# Patient Record
Sex: MALE | URBAN - METROPOLITAN AREA
[De-identification: names, ages, dates, MRNs, and addresses within clinical notes are randomized per-mention and may not be internally consistent; named-entity substitution may affect disease eponyms.]

---

## 2022-03-22 ENCOUNTER — HOSPITAL ENCOUNTER (OUTPATIENT)
Age: 47
Setting detail: SPECIMEN
Discharge: HOME | End: 2022-03-22

## 2022-03-22 LAB
ABSOLUTE EOS #: 0.17 K/UL (ref 0–0.44)
ABSOLUTE IMMATURE GRANULOCYTE: 0.04 K/UL (ref 0–0.3)
ABSOLUTE LYMPH #: 2.72 K/UL (ref 1.1–3.7)
ABSOLUTE MONO #: 0.78 K/UL (ref 0.1–1.2)
ALBUMIN SERPL-MCNC: 4.2 G/DL (ref 3.5–5.2)
ALBUMIN/GLOBULIN RATIO: 1.3 (ref 1–2.5)
ALP BLD-CCNC: 102 U/L (ref 40–129)
ALT SERPL-CCNC: 26 U/L (ref 5–41)
ANION GAP SERPL CALCULATED.3IONS-SCNC: 16 MMOL/L (ref 9–17)
AST SERPL-CCNC: 23 U/L
BASOPHILS # BLD: 1 % (ref 0–2)
BASOPHILS ABSOLUTE: 0.07 K/UL (ref 0–0.2)
BILIRUB SERPL-MCNC: 0.22 MG/DL (ref 0.3–1.2)
BUN BLDV-MCNC: 19 MG/DL (ref 6–20)
CALCIUM SERPL-MCNC: 9.2 MG/DL (ref 8.6–10.4)
CHLORIDE BLD-SCNC: 101 MMOL/L (ref 98–107)
CHOLESTEROL, FASTING: 161 MG/DL
CHOLESTEROL/HDL RATIO: 4.1
CO2: 22 MMOL/L (ref 20–31)
CREAT SERPL-MCNC: 1.01 MG/DL (ref 0.7–1.2)
EOSINOPHILS RELATIVE PERCENT: 2 % (ref 1–4)
FOLATE: 9.6 NG/ML
GFR AFRICAN AMERICAN: >60 ML/MIN
GFR NON-AFRICAN AMERICAN: >60 ML/MIN
GFR SERPL CREATININE-BSD FRML MDRD: ABNORMAL ML/MIN/{1.73_M2}
GLUCOSE BLD-MCNC: 76 MG/DL (ref 70–99)
HCT VFR BLD CALC: 49.1 % (ref 40.7–50.3)
HDLC SERPL-MCNC: 39 MG/DL
HEMOGLOBIN: 15.5 G/DL (ref 13–17)
IMMATURE GRANULOCYTES: 0 %
LDL CHOLESTEROL: 86 MG/DL (ref 0–130)
LYMPHOCYTES # BLD: 27 % (ref 24–43)
MCH RBC QN AUTO: 29 PG (ref 25.2–33.5)
MCHC RBC AUTO-ENTMCNC: 31.6 G/DL (ref 28.4–34.8)
MCV RBC AUTO: 91.8 FL (ref 82.6–102.9)
MONOCYTES # BLD: 8 % (ref 3–12)
NRBC AUTOMATED: 0 PER 100 WBC
PDW BLD-RTO: 13.3 % (ref 11.8–14.4)
PLATELET # BLD: 353 K/UL (ref 138–453)
PMV BLD AUTO: 10.1 FL (ref 8.1–13.5)
POTASSIUM SERPL-SCNC: 4 MMOL/L (ref 3.7–5.3)
RBC # BLD: 5.35 M/UL (ref 4.21–5.77)
SEG NEUTROPHILS: 62 % (ref 36–65)
SEGMENTED NEUTROPHILS ABSOLUTE COUNT: 6.47 K/UL (ref 1.5–8.1)
SODIUM BLD-SCNC: 139 MMOL/L (ref 135–144)
TOTAL PROTEIN: 7.4 G/DL (ref 6.4–8.3)
TRIGLYCERIDE, FASTING: 180 MG/DL
TSH SERPL DL<=0.05 MIU/L-ACNC: 3.94 MIU/L (ref 0.3–5)
VITAMIN B-12: 601 PG/ML (ref 232–1245)
VITAMIN D 25-HYDROXY: 9.2 NG/ML
WBC # BLD: 10.3 K/UL (ref 3.5–11.3)

## 2023-10-16 ENCOUNTER — HOSPITAL ENCOUNTER (OUTPATIENT)
Dept: PHYSICAL THERAPY | Age: 48
Setting detail: THERAPIES SERIES
Discharge: HOME | End: 2023-10-16
Payer: COMMERCIAL

## 2023-10-16 PROCEDURE — 97161 PT EVAL LOW COMPLEX 20 MIN: CPT

## 2023-10-16 PROCEDURE — G0283 ELEC STIM OTHER THAN WOUND: HCPCS

## 2023-10-16 PROCEDURE — 97140 MANUAL THERAPY 1/> REGIONS: CPT

## 2023-10-16 NOTE — PLAN OF CARE
[facility]           Phone: [phone]             Outpatient Physical Therapy  Fax: [phone]                                           Date: 10/16/2023  Patient: Jenna Brown : 1975 [facility] #: [id number]   Referring Physician: Caryn Singh MD      [x] Plan of Care   [] Updated Plan of Care    Dates of Service to Include: 10/16/2023 to 23    Diagnosis:  M25.551 Pain in L hip, m25.551 pain in R hip    Rehab (Treatment) Diagnosis:  L hip pain             Onset Date:  23    Attendance  Total # of Visits to Date: 1        Assessment  Assessment: Pt is a 50year old male with chronic L hip pain with progressively worsening symptoms when sleeping on his L side. Pt presents with point tenderness to L trochanter, decrease L hip abductors and L posterior rotated illium. Pt will benefit from skilled PT in order to address these deficits.       Goals  Short Term Goals  Time Frame for Short Term Goals: 2 weeks  Short Term Goal 1: Pt will be educated on his POC and HEP-met  Short Term Goal 2: Pt will maintain proper pelvic alignment in order to decrease L hip pain  Long Term Goals  Time Frame for Long Term Goals : 4 weeks  Long Term Goal 1: Pt will be safe and independent with his HEP  Long Term Goal 2: Pt will increase L hip abduction strength to 5/5 in order to increase ambulation tolerance  Long Term Goal 3: Pt will deny tenderness to L hip trochanter in order to increase sleeping tolerance on L side  Long Term Goal 4: Pt will report 70% improvement in symptoms     Prognosis  Therapy Prognosis: Good    Treatment Plan   Plan Frequency: 2x/wk  Plan weeks: 4 weeks  [x] HP/CP      [x] Electrical Stim   [x] Therapeutic Exercise      [] Gait Training  [x] Aquatics   [x] Ultrasound         [x] Patient Education/HEP   [x] Manual Therapy  [] Traction    [] Neuro-petty        [x] Soft Tissue Mobs            []

## 2023-10-16 NOTE — PROGRESS NOTES
Assessment  Assessment: Pt is a 50year old male with chronic L hip pain with progressively worsening symptoms when sleeping on his L side. Pt presents with point tenderness to L trochanter, decrease L hip abductors and L posterior rotated illium. Pt will benefit from skilled PT in order to address these deficits. Therapy Prognosis: Good        Decision Making: Low Complexity    Patient Education  Patient Education: pt educated on his POC andHPE  Pt verbalized/demonstrated good understanding:     [X] Yes         [] No, pt required further clarification.       Goals  Short Term Goals  Time Frame for Short Term Goals: 2 weeks  Short Term Goal 1: Pt will be educated on his POC and HEP-met  Short Term Goal 2: Pt will maintain proper pelvic alignment in order to decrease L hip pain    Long Term Goals  Time Frame for Long Term Goals : 4 weeks  Long Term Goal 1: Pt will be safe and independent with his HEP  Long Term Goal 2: Pt will increase L hip abduction strength to 5/5 in order to increase ambulation tolerance  Long Term Goal 3: Pt will deny tenderness to L hip trochanter in order to increase sleeping tolerance on L side  Long Term Goal 4: Pt will report 70% improvement in symptoms      Patient Goals : \"to sleep without pain\"        Minutes Tracking:  Time In: 1156  Time Out: 1240  Minutes: 44  Timed Code Treatment Minutes: [address] PT, DPT    10/16/2023  Electronically signed by Terry Campos PT on 10/16/2023 at 12:44 PM

## 2023-10-18 ENCOUNTER — HOSPITAL ENCOUNTER (OUTPATIENT)
Dept: PHYSICAL THERAPY | Age: 48
Setting detail: THERAPIES SERIES
Discharge: HOME | End: 2023-10-18
Payer: COMMERCIAL

## 2023-10-18 PROCEDURE — 97110 THERAPEUTIC EXERCISES: CPT

## 2023-10-18 NOTE — PROGRESS NOTES
Phone: [address] [place] [address]           Fax: [phone]                           Outpatient Physical Therapy                                                                            Daily Note    Patient: Roshan Pulido : 1975  CSN #: [id number]   Referring Physician: Korin Tsang MD  Date: 10/18/2023    Diagnosis: M25.551 Pain in L hip, m25.551 pain in R hip  Treatment Diagnosis: L hip pain    Onset Date: 23  PT Insurance Information: [place] advantage  Total # of Visits Approved: 12 Per Physician Order  Total # of Visits to Date: 2  No Show: 0  Canceled Appointment: 0    23 Plan of Care/Recert Due    Pre-Treatment Pain:  4/10  Subjective: Pt reports 4/10 pain today L > R. Pt states he slept a little better last night. Exercises:  Exercise 1: HEP LTR, clam shells  Exercise 2: Scifit 5 min  Exercise 3: Sink ex x10, ASU 6\" x10 ea  Exercise 4: LTR, Sidelying clamshells and hip abd, SLR x10 ea, hooklying ball squeeze and GTB abd x15    Manual:  Muscle Energy: MET for L posterior pelvic rotation    Modality:     Modality Flow Sheet:   Performed (X) Tx Modality    Electrical Stim:      Ultrasound: ___ W/cm2 x ___ mins  Duty factor: __100%  __50%  __20% __10%  Head size:   MHz: __1mHz __2 mHz  __3mHz  Location:    Hot Pack:   x Cold Pack: 10 min L hip    Cervical Traction:  Pull:  Weight ____  Rest ____    Hold Time: _____ sec   Rest: ____  Total Tx Time: ____min    Lumbar Traction:  Pull:  Weight ____  Rest ____    Hold Time: _____ sec   Rest: ____  Total Tx Time: ____min  Bed Split:  Yes _____   or   No  _____    Iontophoresis:  Dexamethasone ______  Other: ______  Doseage: _____mA        Dry Needling: ___\" needle to at homeostatic neuro- trigger points to. ..   ___No manipulation/static  ___Basic Manipulation  ___Pistoning Manipulation  ___Needle Rotation  ___Tenting           Assessment  Assessment: Pt with good tolerance to tx, no increase in pain.  MET to L

## 2023-10-24 ENCOUNTER — HOSPITAL ENCOUNTER (OUTPATIENT)
Dept: PHYSICAL THERAPY | Age: 48
Setting detail: THERAPIES SERIES
Discharge: HOME | End: 2023-10-24
Payer: COMMERCIAL

## 2023-10-24 PROCEDURE — 97110 THERAPEUTIC EXERCISES: CPT

## 2023-10-24 PROCEDURE — G0283 ELEC STIM OTHER THAN WOUND: HCPCS

## 2023-10-24 NOTE — PROGRESS NOTES
Tolerance  Activity Tolerance: Patient tolerated treatment well    Patient Education  Patient Education: progression of exercises  Pt verbalized/demonstrated good understanding:     [x] Yes         [] No, pt required further clarification.        Post Treatment Pain:  2/10      Plan  Plan Frequency: 2x/wk  Plan weeks: 4 weeks       Goals  (Total # of Visits to Date: 3)      Short Term Goals  Time Frame for Short Term Goals: 2 weeks  Short Term Goal 1: Pt will be educated on his POC and HEP-met  Short Term Goal 2: Pt will maintain proper pelvic alignment in order to decrease L hip pain    Long Term Goals  Time Frame for Long Term Goals : 4 weeks  Long Term Goal 1: Pt will be safe and independent with his HEP  Long Term Goal 2: Pt will increase L hip abduction strength to 5/5 in order to increase ambulation tolerance  Long Term Goal 3: Pt will deny tenderness to L hip trochanter in order to increase sleeping tolerance on L side  Long Term Goal 4: Pt will report 70% improvement in symptoms    Minutes Tracking:  Time In: 1114  Time Out: 1203  Minutes: 49  Timed Code Treatment Minutes: [address] PT DPT      Date: 10/24/2023

## 2023-10-26 ENCOUNTER — HOSPITAL ENCOUNTER (OUTPATIENT)
Dept: PHYSICAL THERAPY | Age: 48
Setting detail: THERAPIES SERIES
Discharge: HOME | End: 2023-10-26
Payer: COMMERCIAL

## 2023-10-26 PROCEDURE — 97110 THERAPEUTIC EXERCISES: CPT

## 2023-10-26 NOTE — PROGRESS NOTES
Phone: [facility]           Fax: [phone]                           Outpatient Physical Therapy                                                                            Daily Note    Patient: Sabine Found : 1975  CSN #: [id number]   Referring Physician: Sahara Perkins MD  Date: 10/26/2023    Diagnosis: M25.551 Pain in L hip, m25.551 pain in R hip  Treatment Diagnosis: L hip pain    Onset Date: 23  PT Insurance Information: [place] advantage  Total # of Visits Approved: 12 Per Physician Order  Total # of Visits to Date: 4  No Show: 0  Canceled Appointment: 0    23 Plan of Care/Recert Due    Pre-Treatment Pain:  3-4/10  Subjective: Pt states he is doing a little better pain 3-4/10. Exercises:  Exercise 1: HEP LTR, clam shells  Exercise 2: Scifit 8 min  Exercise 3: Sink ex x10, ASU/LSU 6\" x10 ea  Exercise 4: LTR, Sidelying clamshells and hip abd, SLR x10 ea, hooklying ball squeeze and GTB abd x15  Exercise 5: Standing BTB 4-way hip x10 ea      Assessment  Assessment: Progressed ther ex for hip strengthening, good tolerance. No MET required this visit. Pt reports 20% improvement in symptoms. Pt declined MHP and IFC today. Continue to progress as pt tolerates. Activity Tolerance  Activity Tolerance: Patient tolerated treatment well    Patient Education  Patient Education: progression of exercises  Pt verbalized/demonstrated good understanding:     [x] Yes         [] No, pt required further clarification.        Post Treatment Pain:  3/10      Plan  Plan Frequency: 2x/wk  Plan weeks: 4 weeks       Goals  (Total # of Visits to Date: 4)      Short Term Goals  Time Frame for Short Term Goals: 2 weeks  Short Term Goal 1: Pt will be educated on his POC and HEP-met  Short Term Goal 2: Pt will maintain proper pelvic alignment in order to decrease L hip pain-progressing    Long Term Goals  Time Frame for Long Term Goals : 4 weeks  Long Term Goal 1: Pt

## 2023-10-30 ENCOUNTER — HOSPITAL ENCOUNTER (OUTPATIENT)
Dept: PHYSICAL THERAPY | Age: 48
Setting detail: THERAPIES SERIES
Discharge: HOME | End: 2023-10-30
Payer: COMMERCIAL

## 2023-10-30 PROCEDURE — 97110 THERAPEUTIC EXERCISES: CPT

## 2023-10-30 NOTE — PROGRESS NOTES
decrease L hip pain-met    Long Term Goals  Time Frame for Long Term Goals : 4 weeks  Long Term Goal 1: Pt will be safe and independent with his HEP-progressing  Long Term Goal 2: Pt will increase L hip abduction strength to 5/5 in order to increase ambulation tolerance-progressing(4/5 10/30/23)  Long Term Goal 3: Pt will deny tenderness to L hip trochanter in order to increase sleeping tolerance on L side-progressing  Long Term Goal 4: Pt will report 70% improvement in symptoms-progressing (20 % improvement in symptoms 10/26/23)    Minutes Tracking:  Time In: 0800  Time Out: [place]  Minutes: [address], [place]     Date: 10/30/2023

## 2023-11-01 ENCOUNTER — HOSPITAL ENCOUNTER (OUTPATIENT)
Dept: PHYSICAL THERAPY | Age: 48
Setting detail: THERAPIES SERIES
Discharge: HOME | End: 2023-11-01
Payer: COMMERCIAL

## 2023-11-01 PROCEDURE — 97110 THERAPEUTIC EXERCISES: CPT

## 2023-11-01 NOTE — PROGRESS NOTES
Phone: [facility] [place]           Fax: [phone]                           Outpatient Physical Therapy                                                                            Daily Note    Patient: Radha Borja : 1975  CSN #: [id number]   Referring Physician: Jannet Mckeon MD  Date: 2023    Diagnosis: M25.551 Pain in L hip, m25.551 pain in R hip  Treatment Diagnosis: L hip pain    Onset Date: 23  PT Insurance Information: [place] advantage  Total # of Visits Approved: 12 Per Physician Order  Total # of Visits to Date: 6  No Show: 0  Canceled Appointment: 0    23 Plan of Care/Recert Due    Pre-Treatment Pain:  4/10  Subjective: Pt reports 4/10 L hip pain today. Exercises:  Exercise 1: HEP LTR, clam shells  Exercise 2: Scifit 8 min L2.5  Exercise 3: Sink ex x15, ASU/LSU 6\" x15 ea  Exercise 5: Standing BTB 4-way hip x15 ea  Exercise 6: Sidestepping at counter with GTB x3 laps  Exercise 7: TG DL, SL x15 ea    Assessment  Assessment: Progressed strengtheing with TG this visit, good tolerance. Pt reported \"popping\" in L hip with standing marching this visit. Continue to progress as pt tolerates. Activity Tolerance  Activity Tolerance: Patient tolerated treatment well    Patient Education  Patient Education: HEP  Pt verbalized/demonstrated good understanding:     [x] Yes         [] No, pt required further clarification.        Post Treatment Pain:  3-4/10      Plan  Plan Frequency: 2x/wk  Plan weeks: 4 weeks       Goals  (Total # of Visits to Date: 6)      Short Term Goals  Time Frame for Short Term Goals: 2 weeks  Short Term Goal 1: Pt will be educated on his POC and HEP-met  Short Term Goal 2: Pt will maintain proper pelvic alignment in order to decrease L hip pain-met    Long Term Goals  Time Frame for Long Term Goals : 4 weeks  Long Term Goal 1: Pt will be safe and independent with his HEP-progressing  Long Term Goal 2: Pt will increase L hip

## 2023-11-07 ENCOUNTER — HOSPITAL ENCOUNTER (OUTPATIENT)
Dept: PHYSICAL THERAPY | Age: 48
Setting detail: THERAPIES SERIES
Discharge: HOME | End: 2023-11-07
Payer: COMMERCIAL

## 2023-11-07 PROCEDURE — 97110 THERAPEUTIC EXERCISES: CPT

## 2023-11-07 NOTE — PROGRESS NOTES
Phone: [address] [place]           Fax: [phone]                           Outpatient Physical Therapy                                                                            Daily Note    Patient: Jose R Guerin : 1975  CSN #: [id number]   Referring Physician: Grace Meza MD  Date: 2023    Diagnosis: M25.551 Pain in L hip, m25.551 pain in R hip  Treatment Diagnosis: L hip pain    Onset Date: 23  PT Insurance Information: [place] advantage  Total # of Visits Approved: 12 Per Physician Order  Total # of Visits to Date: 7  No Show: 0  Canceled Appointment: 0    23 Plan of Care/Recert Due    Pre-Treatment Pain:  6/10  Subjective: Pt reports 6/10 L hip pain today, and some pain through R hip as well. Pt went rock climbing this weekend, so believes some of his pain may be from that. Exercises:  Exercise 1: HEP LTR, clam shells  Exercise 2: Scifit 8 min L2.5  Exercise 3: Sink ex x15, ASU 8\"/LSU 6\" x15 ea  Exercise 4: LTR, Sidelying clamshells and hip abd, SLR x15 ea, hooklying ball squeeze and GTB single leg fall outs x15 --Just LTR and single leg fall outs today  Exercise 8: [place] backwards walking 20# x5, sidestepping x10 each side 12#  Exercise 9: HS stretch on step BLE 2x30\", SB calf stretch 2x30\"        Assessment  Assessment: Progressed strengthening with [place] weighted bwd walking and side stepping with good pt tolerance. Pt reported a reduction in pain at end of session. Will continue to progress as pt tolerates. Activity Tolerance  Activity Tolerance: Patient tolerated treatment well    Patient Education  Patient Education: HEP  Pt verbalized/demonstrated good understanding:     [x] Yes         [] No, pt required further clarification.     Post Treatment Pain:  3-4/10      Plan  Plan Frequency: 2x/wk  Plan weeks: 4 weeks       Goals  (Total # of Visits to Date: 7)      Short Term Goals  Time Frame for Short Term Goals: 2 weeks  Short

## 2023-11-09 ENCOUNTER — HOSPITAL ENCOUNTER (OUTPATIENT)
Dept: PHYSICAL THERAPY | Age: 48
Setting detail: THERAPIES SERIES
Discharge: HOME | End: 2023-11-09
Payer: COMMERCIAL

## 2023-11-09 PROCEDURE — 97035 APP MDLTY 1+ULTRASOUND EA 15: CPT

## 2023-11-09 PROCEDURE — G0283 ELEC STIM OTHER THAN WOUND: HCPCS

## 2023-11-09 PROCEDURE — 97110 THERAPEUTIC EXERCISES: CPT

## 2023-11-09 NOTE — PROGRESS NOTES
Phone: [address] [facility]           Fax: [phone]                           Outpatient Physical Therapy                                                                            Daily Note    Patient: Chanda Skiff : 1975  CSN #: [id number]   Referring Physician: Maru Gan MD  Date: 2023       Treatment Diagnosis: L hip pain    Onset Date: 23  PT Insurance Information: [place] advantage  Total # of Visits Approved: 12 Per Physician Order  Total # of Visits to Date: 8  No Show: 0  Canceled Appointment: 0    23 Plan of Care/Recert Due    Pre-Treatment Pain:  5/10  Subjective: Pt reports pain 5/10 in L hip this date with min pain in R hip. Exercises:  Exercise 1: HEP LTR, clam shells  Exercise 2: Scifit 8 min L3  Exercise 3: Sink ex x15, ASU 8\"/LSU 6\" x15 ea  Exercise 5: Standing BTB 4-way hip x15 ea  Exercise 6: Sidestepping at counter with GTB x3 laps  Exercise 7: TG DL, SL x15 ea  Exercise 8: [place] backwards walking 20# 10x, sidestepping 10x each side 13#  Exercise 9: HS stretch on step BLE 2x30\", SB calf stretch 2x30\", hip flexor stretch 2x30\"    Modality:     Modality Flow Sheet:   Performed (X) Tx Modality   x Electrical Stim:  IFC to L hip to reduce discomfort    x Ultrasound: __1.5_ W/cm2 x __8_ mins  Duty factor: __100%  __50%  x__20% __10%  Head size:   MHz: _x_1mHz __2 mHz  __3mHz  Location: L hip    x Hot Pack: L hip     Cold Pack:    Cervical Traction:  Pull:  Weight ____  Rest ____    Hold Time: _____ sec   Rest: ____  Total Tx Time: ____min    Lumbar Traction:  Pull:  Weight ____  Rest ____    Hold Time: _____ sec   Rest: ____  Total Tx Time: ____min  Bed Split:  Yes _____   or   No  _____    Iontophoresis:  Dexamethasone ______  Other: ______  Doseage: _____mA        Dry Needling: ___\" needle to superficial radial, deep radial and  lataeral antebrachial cutaneous at homeostatic neuro- trigger points to. ..   ___No

## 2023-11-14 ENCOUNTER — HOSPITAL ENCOUNTER (OUTPATIENT)
Dept: PHYSICAL THERAPY | Age: 48
Setting detail: THERAPIES SERIES
Discharge: HOME | End: 2023-11-14
Payer: COMMERCIAL

## 2023-11-14 PROCEDURE — 97110 THERAPEUTIC EXERCISES: CPT

## 2023-11-14 PROCEDURE — G0283 ELEC STIM OTHER THAN WOUND: HCPCS

## 2023-11-14 NOTE — PROGRESS NOTES
Phone: [phone]                 [facility]           Fax: [phone]                           Outpatient Physical Therapy                                                                            Daily Note    Patient: Shira Berger : 1975  [facility] #: [id number]   Referring Physician: Jonathan Huynh MD  Date: 2023       Treatment Diagnosis: L hip pain    Onset Date: 23  PT Insurance Information: [place] advantage  Total # of Visits Approved: 12 Per Physician Order  Total # of Visits to Date: 9  No Show: 0  Canceled Appointment: 0    23 Plan of Care/Recert Due    Pre-Treatment Pain:  3/10  Subjective: Pt. reports 3/10 L hip pain upon arrival.    Exercises:  Exercise 1: HEP LTR, clam shells  Exercise 2: Scifit 10 min L3  Exercise 3: Sink ex x15, ASU 8\"/LSU 6\" x15 ea  Exercise 5: Standing BTB 4-way hip x15 ea  Exercise 6: Sidestepping at counter with GTB x3 laps  Exercise 7: TG DL, SL x15 ea  Exercise 9: HS stretch on step BLE 2x30\", SB calf stretch 2x30\", hip flexor stretch 2x30\"  Exercise 10: Standing L IT band stretch    Manual:       Modality:     Modality Flow Sheet:   Performed (X) Tx Modality   x Electrical Stim:   IFC to L  hip to aide in decreased pain     Ultrasound: ___ W/cm2 x ___ mins  Duty factor: __100%  __50%  __20% __10%  Head size: 10 mm      ______ Other:   MHz: __1mHz __2 mHz  __3mHz  Location:                                         x Hot Pack:    Cold Pack:    Cervical Traction:  Pull:  Weight ____  Rest ____    Hold Time: _____ sec   Rest: ____  Total Tx Time: ____min    Lumbar Traction:  Pull:  Weight ____  Rest ____    Hold Time: _____ sec   Rest: ____  Total Tx Time: ____min  Bed Split:  Yes _____   or   No  _____    Iontophoresis:  Dexamethasone ______  Other: ______  Doseage: _____mA        Dry Needling: ___\" needle to at homeostatic neuro- trigger points to. ..   ___No manipulation/static  ___Basic Manipulation  ___Pistoning

## 2023-11-16 ENCOUNTER — HOSPITAL ENCOUNTER (OUTPATIENT)
Dept: PHYSICAL THERAPY | Age: 48
Setting detail: THERAPIES SERIES
Discharge: HOME | End: 2023-11-16
Payer: COMMERCIAL

## 2023-11-16 PROCEDURE — 97110 THERAPEUTIC EXERCISES: CPT

## 2023-11-16 PROCEDURE — G0283 ELEC STIM OTHER THAN WOUND: HCPCS

## 2023-11-16 PROCEDURE — 97035 APP MDLTY 1+ULTRASOUND EA 15: CPT

## 2023-11-16 NOTE — PROGRESS NOTES
Phone: [address] [place] [address] [place]           Fax: [phone]                           Outpatient Physical Therapy                                                                            Daily Note    Patient: Get Howard : 1975  CSN #: [id number]   Referring Physician: Harrison An MD  Date: 2023       Treatment Diagnosis: L hip pain    Onset Date: 23  PT Insurance Information: [place] advantage  Total # of Visits Approved: 12 Per Physician Order  Total # of Visits to Date: 10  No Show: 0  Canceled Appointment: 0    23 Plan of Care/Recert Due    Pre-Treatment Pain:  2/10  Subjective: Pt. reports 2/10 L hip pain upon arrival. Pt reports every morning he wakes up to pain /10, once moving around the pain decreases. RTD Dec 15.     Exercises:  Exercise 1: HEP LTR, clam shells  Exercise 2: Scifit 10 min L3  Exercise 3: Sink ex x15, ASU 8\"/LSU 6\" x15 ea  Exercise 4: LTR, Sidelying clamshells and hip abd, SLR x15 ea, hooklying ball squeeze and GTB single leg fall outs x15 --Just LTR and single leg fall outs today  Exercise 6: Sidestepping at counter with GTB x3 laps  Exercise 8: [place] backwards walking 20# 10x, sidestepping 10x each side 13#  Exercise 9: HS stretch on step BLE 2x30\", SB calf stretch 2x30\", hip flexor stretch 2x30\"  Exercise 10: Standing L IT band stretch    Modality:     Modality Flow Sheet:   Performed (X) Tx Modality   x Electrical Stim: IFC to reduce discomfort and promote healing      x Ultrasound: _1.5__ W/cm2 x __8_ mins  Duty factor: __100%  __50%  x__20% __10%  Head size:   MHz: _x_1mHz __2 mHz  __3mHz  Location: L hip     Hot Pack:   x Cold Pack: L hip     Cervical Traction:  Pull:  Weight ____  Rest ____    Hold Time: _____ sec   Rest: ____  Total Tx Time: ____min    Lumbar Traction:  Pull:  Weight ____  Rest ____    Hold Time: _____ sec   Rest: ____  Total Tx Time: ____min  Bed Split:  Yes _____   or   No  _____    Iontophoresis:

## 2023-11-21 ENCOUNTER — HOSPITAL ENCOUNTER (OUTPATIENT)
Dept: PHYSICAL THERAPY | Age: 48
Setting detail: THERAPIES SERIES
Discharge: HOME | End: 2023-11-21
Payer: COMMERCIAL

## 2023-11-21 PROCEDURE — G0283 ELEC STIM OTHER THAN WOUND: HCPCS

## 2023-11-21 PROCEDURE — 97110 THERAPEUTIC EXERCISES: CPT

## 2023-11-21 NOTE — PROGRESS NOTES
Phone: [address] [place]           Fax: [phone]                           Outpatient Physical Therapy                                                                            Daily Note    Patient: Bria Robles : 1975  [facility] #: [id number]   Referring Physician: Bertha Zuniga MD  Date: 2023       Treatment Diagnosis: L hip pain    Onset Date: 23  PT Insurance Information: [place] advantage  Total # of Visits Approved: 12 Per Physician Order  Total # of Visits to Date: 11  No Show: 0  Canceled Appointment: 0    23 Plan of Care/Recert Due    Pre-Treatment Pain:  4/10  Subjective: Pt. reports L hip pain 4/10 upon arrival, stated overall he feels his hip pain is about the same with little improvement.     Exercises:  Exercise 1: HEP LTR, clam shells  Exercise 2: Scifit 10 min L3  Exercise 3: Sink ex x15, ASU 8\"/LSU 6\" x15 ea  Exercise 4: LTR, Sidelying clamshells and hip abd, SLR x15 ea, hooklying ball squeeze and GTB single leg fall outs x15 --Just LTR and single leg fall outs today  Exercise 6: Sidestepping at counter with GTB x3 laps  Exercise 8: [place] backwards walking 20# 10x, sidestepping 10x each side 13#  Exercise 9: HS stretch on step BLE 2x30\", SB calf stretch 2x30\", hip flexor stretch 2x30\"  Exercise 10: Standing L IT band stretch    Modality:     Modality Flow Sheet:   Performed (X) Tx Modality   x Electrical Stim: IFC to L hip to reduce pain       Ultrasound: ___ W/cm2 x ___ mins  Duty factor: __100%  __50%  __20% __10%  Head size: 10 mm      ______ Other:   MHz: __1mHz __2 mHz  __3mHz  Location:                                          Hot Pack:   x Cold Pack:    Cervical Traction:  Pull:  Weight ____  Rest ____    Hold Time: _____ sec   Rest: ____  Total Tx Time: ____min    Lumbar Traction:  Pull:  Weight ____  Rest ____    Hold Time: _____ sec   Rest: ____  Total Tx Time: ____min  Bed Split:  Yes _____   or   No  _____    Iontophoresis:

## 2023-11-24 ENCOUNTER — HOSPITAL ENCOUNTER (OUTPATIENT)
Dept: PHYSICAL THERAPY | Age: 48
Setting detail: THERAPIES SERIES
Discharge: HOME | End: 2023-11-24
Payer: COMMERCIAL

## 2023-11-24 PROCEDURE — 97110 THERAPEUTIC EXERCISES: CPT

## 2023-11-24 NOTE — PROGRESS NOTES
Phone: [address]           Fax: [phone]                           Outpatient Physical Therapy                                                                            Daily Note    Patient: Antony Lozoya : 1975  CSN #: [id number]   Referring Physician: Tracie Bazzi MD  Date: 2023    Diagnosis: M25.551 Pain in L hip, m25.551 pain in R hip  Treatment Diagnosis: L hip pain    Onset Date: 23  PT Insurance Information: [place] advantage  Total # of Visits Approved: 12 Per Physician Order  Total # of Visits to Date: 12  No Show: 0  Canceled Appointment: 0    23 Plan of Care/Recert Due    Pre-Treatment Pain:  2/10  Subjective: Pt reports L hip pain 2/10 today. Pt states he is still tossing and turning in his sleep, unable to lay on each hip for more than 1-2 hours d/t pain. Exercises:  Exercise 1: HEP LTR, clam shells  Exercise 2: Scifit 10 min L3  Exercise 3: Sink ex x15, ASU 8\"/LSU 6\" x15 ea  Exercise 4: LTR, Sidelying clamshells and hip abd, SLR x15 ea, hooklying ball squeeze and GTB single leg fall outs x15 --Just LTR and single leg fall outs today  Exercise 5: Standing PTB 4-way hip x15 ea  Exercise 6: Sidestepping at counter with GTB x3 laps  Exercise 8: [place] backwards walking 20# 10x, sidestepping 10x each side 13#  Exercise 9: HS stretch on step BLE 2x30\", SB calf stretch 2x30\", hip flexor stretch 2x30\"  Exercise 10: Standing L IT band stretch 3x30\" in supine today      Assessment  Assessment: Pt continues to have \"popping\" in L hip with some standing exercises this visit. Pt continues to maintain pelvic alignment. L hip flex 4/5, ABD 4-/5 in supine. R hip flex 4/5, ABD 4/5 in supine. Pt reports minimal improvement in symptoms, stating it varies by the day.     Activity Tolerance  Activity Tolerance: Patient tolerated treatment well    Patient Education  Patient Education: HEP  Pt verbalized/demonstrated good understanding:     [x]

## 2023-11-28 ENCOUNTER — HOSPITAL ENCOUNTER (OUTPATIENT)
Dept: PHYSICAL THERAPY | Age: 48
Setting detail: THERAPIES SERIES
Discharge: HOME | End: 2023-11-28
Payer: COMMERCIAL

## 2023-11-28 PROCEDURE — 97110 THERAPEUTIC EXERCISES: CPT

## 2023-11-28 PROCEDURE — G0283 ELEC STIM OTHER THAN WOUND: HCPCS

## 2023-11-28 NOTE — PROGRESS NOTES
Phone: [address] [place] [address]           Fax: [phone]                           Outpatient Physical Therapy                                                                            Daily Note    Patient: Zoe Baldwin : 1975  [facility] #: [id number]   Referring Physician: Ra Hsu MD  Date: 2023    Diagnosis: M25.551 Pain in L hip, m25.551 pain in R hip  Treatment Diagnosis: L hip pain    Onset Date: 23  PT Insurance Information: [address] [address] visits  Total # of Visits Approved: 24 Per Physician Order  Total # of Visits to Date: 13  No Show: 0  Canceled Appointment: 0    24 Plan of Care/Recert Due    Pre-Treatment Pain:  10  Subjective: Pt reports /10 L hip pain today, stating when he woke up both hips were sore. Exercises:  Exercise 1: HEP LTR, clam shells  Exercise 2: Scifit 10 min L3  Exercise 3: Sink ex x15, ASU 8\"/LSU 6\" x15 ea  Exercise 5: Standing PTB 4-way hip x15 ea  Exercise 6: Sidestepping and monster walks with GTB x3 laps  Exercise 8: Vicki backwards walking 20# 10x, sidestepping 10x each side 13#      Modality:     Modality Flow Sheet:   Performed (X) Tx Modality   x Electrical Stim: IFC x15 min L hip for pain and discomfort      Ultrasound: ___ W/cm2 x ___ mins  Duty factor: __100%  __50%  __20% __10%  Head size: 10 mm      ______ Other:   MHz: __1mHz __2 mHz  __3mHz  Location:                                         x Hot Pack: with IFC    Cold Pack:    Cervical Traction:  Pull:  Weight ____  Rest ____    Hold Time: _____ sec   Rest: ____  Total Tx Time: ____min    Lumbar Traction:  Pull:  Weight ____  Rest ____    Hold Time: _____ sec   Rest: ____  Total Tx Time: ____min  Bed Split:  Yes _____   or   No  _____    Iontophoresis:  Dexamethasone ______  Other: ______  Doseage: _____mA        Dry Needling: ___\" needle to at [facility] neuro- trigger points to. ..   ___No manipulation/static  ___Basic Manipulation  ___Pistoning

## 2023-11-30 ENCOUNTER — HOSPITAL ENCOUNTER (OUTPATIENT)
Dept: PHYSICAL THERAPY | Age: 48
Setting detail: THERAPIES SERIES
Discharge: HOME | End: 2023-11-30
Payer: COMMERCIAL

## 2023-11-30 PROCEDURE — 97530 THERAPEUTIC ACTIVITIES: CPT

## 2023-11-30 PROCEDURE — 97110 THERAPEUTIC EXERCISES: CPT

## 2023-11-30 PROCEDURE — G0283 ELEC STIM OTHER THAN WOUND: HCPCS

## 2023-11-30 NOTE — PROGRESS NOTES
Phone: [address]           Fax: [phone]                           Outpatient Physical Therapy                                                                            Daily Note    Patient: Carleen Wong : 1975  CSN #: [id number]   Referring Physician: Guy Restrepo MD  Date: 2023       Treatment Diagnosis: L hip pain    Onset Date: 23  PT Insurance Information: [address] - 39 visits  Total # of Visits Approved: 24 Per Physician Order  Total # of Visits to Date: 14  No Show: 0  Canceled Appointment: 0    24 Plan of Care/Recert Due    Pre-Treatment Pain:  2/10  Subjective: Pt reports 2/10 pain in L hip prior to therapy session. Pain comes and goes, some days are worse than others. Exercises:  Exercise 1: HEP LTR, clam shells  Exercise 2: Scifit 10 min L3  Exercise 3: Sink ex x15, ASU 8\"/LSU 6\" x15 ea  Exercise 4: LTR, Sidelying clamshells and hip abd, SLR x15 ea, hooklying ball squeeze and GTB single leg fall outs x15 --Just LTR and single leg fall outs today  Exercise 6: Sidestepping and monster walks with GTB x3 laps  Exercise 7: TG DL, SL x15 ea  Exercise 8: [place] backwards walking 20# 10x, sidestepping 10x each side 13#  Exercise 9: HS stretch on step BLE 2x30\", SB calf stretch 2x30\", hip flexor stretch 2x30\"  Exercise 10: Standing L IT band stretch 3x30\" in supine today  Exercise 11: sit to stand 8# 2 x 10    Modality:     Modality Flow Sheet:   Performed (X) Tx Modality   x Electrical Stim:  IFC to reduce discomfort    x Cold Pack: L hip to reduce inflammation        Assessment  Assessment: Pt reported popping sound with hip flexion, min discomfort. Continued with charted ex to improve hip strength and stability. Concluded treatment with IFC/CP to reduce inflammation and discomfort.     Activity Tolerance  Activity Tolerance: Patient tolerated treatment well    Patient Education  Patient Education: HEP  Pt verbalized/demonstrated good

## 2023-12-05 ENCOUNTER — HOSPITAL ENCOUNTER (OUTPATIENT)
Dept: PHYSICAL THERAPY | Age: 48
Setting detail: THERAPIES SERIES
Discharge: HOME | End: 2023-12-05
Payer: COMMERCIAL

## 2023-12-05 PROCEDURE — 97110 THERAPEUTIC EXERCISES: CPT

## 2023-12-05 PROCEDURE — G0283 ELEC STIM OTHER THAN WOUND: HCPCS

## 2023-12-05 NOTE — PROGRESS NOTES
Phone: [address]           Fax: [phone]                           Outpatient Physical Therapy                                                                            Daily Note    Patient: Octavio Trent : 1975  [facility] #: [id number]   Referring Physician: Raphael Gillis MD  Date: 2023       Treatment Diagnosis: L hip pain    Onset Date: 23  PT Insurance Information: [address] - 36 visits  Total # of Visits Approved: 24 Per Physician Order  Total # of Visits to Date: 15  No Show: 0  Canceled Appointment: 0    24 Plan of Care/Recert Due    Pre-Treatment Pain:  2/10  Subjective: Pt reports 2/10 pain in L hip prior to therapy. Stated  overall slowly getting better.     Exercises:  Exercise 1: HEP LTR, clam shells  Exercise 2: Scifit 10 min L3  Exercise 3: Sink ex x15, ASU 8\"/LSU 6\" x15 ea  Exercise 4: LTR, Sidelying clamshells and hip abd, SLR x15 ea, hooklying ball squeeze and GTB single leg fall outs x15 --Just LTR and single leg fall outs today  Exercise 6: Sidestepping and monster walks with GTB x3 laps  Exercise 7: TG DL, SL x15 ea  Exercise 8: Vicki backwards walking 20# 10x, sidestepping 10x each side 13#  Exercise 9: HS stretch on step BLE 2x30\", SB calf stretch 2x30\", hip flexor stretch 2x30\"  Exercise 10: Standing L IT band stretch 3x30\" in supine today  Exercise 11: sit to stand 8# 2 x 10    Modality:     Modality Flow Sheet:   Performed (X) Tx Modality   x Electrical Stim IFC to L hip to aide in decreased pain and swelling       Ultrasound: ___ W/cm2 x ___ mins  Duty factor: __100%  __50%  __20% __10%  Head size: 10 mm      ______ Other:   MHz: __1mHz __2 mHz  __3mHz  Location:                                          Hot Pack:   x Cold Pack: x15' to L hip to aide in decreased pain and swelling    Cervical Traction:  Pull:  Weight ____  Rest ____    Hold Time: _____ sec   Rest: ____  Total Tx Time: ____min    Lumbar Traction:  Pull:  Weight 24

## 2023-12-07 ENCOUNTER — HOSPITAL ENCOUNTER (OUTPATIENT)
Dept: PHYSICAL THERAPY | Age: 48
Setting detail: THERAPIES SERIES
Discharge: HOME | End: 2023-12-07
Payer: COMMERCIAL

## 2023-12-07 PROCEDURE — G0283 ELEC STIM OTHER THAN WOUND: HCPCS

## 2023-12-07 PROCEDURE — 97110 THERAPEUTIC EXERCISES: CPT

## 2023-12-07 NOTE — PROGRESS NOTES
Phone: [address] [place]           Fax: [phone]                           Outpatient Physical Therapy                                                                            Daily Note    Patient: Jose R Guerin : 1975  CSN #: [id number]   Referring Physician: Grace Meza MD  Date: 2023       Treatment Diagnosis: L hip pain    Onset Date: 23  PT Insurance Information: [facility] - [facility] visits  Total # of Visits Approved: 24 Per Physician Order  Total # of Visits to Date: 16  No Show: 0  Canceled Appointment: 0    24 Plan of Care/Recert Due    Pre-Treatment Pain:  3/10  Subjective: Pt, stated L hip pain 3/10 today, felt good after last treatment. Does reports shoulder pain today from an old injury. Exercises:  Exercise 1: HEP LTR, clam shells  Exercise 2: Scifit 10 min L3.5  Exercise 3: Sink ex x15, ASU 8\"/LSU 6\" x15 ea  Exercise 4: LTR, Sidelying clamshells and hip abd, SLR x15 ea, hooklying ball squeeze and GTB single leg fall outs x15 --Just LTR and single leg fall outs today  Exercise 6: Sidestepping and monster walks with GTB x3 laps  Exercise 7: TG DL, SL x15 ea  Exercise 8: [place] backwards walking 20# 10x, sidestepping 10x each side 13#  Exercise 9: HS stretch on step BLE 2x30\", SB calf stretch 2x30\", hip flexor stretch 2x30\"  Exercise 10: Standing L IT band stretch 3x30\" in supine today  Exercise 11: sit to stand 8# 2 x 10      Modality:     Modality Flow Sheet:   Performed (X) Tx Modality   x Electrical Stim: IFC to L hip x15' to aide in decreased pain      Ultrasound: ___ W/cm2 x ___ mins  Duty factor: __100%  __50%  __20% __10%  Head size: 10 mm      ______ Other:   MHz: __1mHz __2 mHz  __3mHz  Location:                                          Hot Pack:   x Cold Pack: x15 to L hip to aide in decreased pain.     Cervical Traction:  Pull:  Weight ____  Rest ____    Hold Time: _____ sec   Rest: ____  Total Tx Time: ____min    Lumbar

## 2023-12-13 ENCOUNTER — HOSPITAL ENCOUNTER (OUTPATIENT)
Dept: PHYSICAL THERAPY | Age: 48
Setting detail: THERAPIES SERIES
Discharge: HOME | End: 2023-12-13
Payer: COMMERCIAL

## 2023-12-13 PROCEDURE — G0283 ELEC STIM OTHER THAN WOUND: HCPCS

## 2023-12-13 PROCEDURE — 97110 THERAPEUTIC EXERCISES: CPT

## 2023-12-13 NOTE — PROGRESS NOTES
Phone: [address] [facility]           Fax: [phone]                           Outpatient Physical Therapy                                                                            Daily Note    Patient: Argelia Verde : 1975  CSN #: [id number]   Referring Physician: Mickey Rogel MD  Date: 2023       Treatment Diagnosis: L hip pain    Onset Date: 23  PT Insurance Information: [address] - 36 visits  Total # of Visits Approved: 24 Per Physician Order  Total # of Visits to Date: 17  No Show: 0  Canceled Appointment: 0    24 Plan of Care/Recert Due    Pre-Treatment Pain:  3/10  Subjective: Pt reports 3/10 pain in L hip prior to treatment session. States his R trap hurts, unsure of injury.     Exercises:  Exercise 1: HEP LTR, clam shells  Exercise 2: Scifit 10 min L4  Exercise 3: Sink ex x15, FSU 8\"/LSU 6\" x15 ea  Exercise 5: Standing BTB 4-way hip x15 ea  Exercise 6: Sidestepping,  monster walks, retro steps with GTB x3 laps  Exercise 7: TG DL, SL x15 ea  Exercise 8: Vicki backwards walking 20# 10x, sidestepping 10x each side 13#  Exercise 9: HS stretch on step BLE 2x30\", SB calf stretch 2x30\", hip flexor stretch 2x30\"  Exercise 11: sit to stand 8# 2 x 15  Exercise 12: BOSU lunges 15x B LE    Modality:     Modality Flow Sheet:   Performed (X) Tx Modality   x Electrical Stim: IFC to reduce discomfort       Ultrasound: ___ W/cm2 x ___ mins  Duty factor: __100%  __50%  __20% __10%  Head size:   MHz: __1mHz __2 mHz  __3mHz  Location:    Hot Pack:   x Cold Pack: to reduce pain and inflammation     Cervical Traction:  Pull:  Weight ____  Rest ____    Hold Time: _____ sec   Rest: ____  Total Tx Time: ____min    Lumbar Traction:  Pull:  Weight ____  Rest ____    Hold Time: _____ sec   Rest: ____  Total Tx Time: ____min  Bed Split:  Yes _____   or   No  _____    Iontophoresis:  Dexamethasone ______  Other: ______  Doseage: _____mA        Dry Needling: ___\" needle to

## 2023-12-15 ENCOUNTER — HOSPITAL ENCOUNTER (OUTPATIENT)
Dept: PHYSICAL THERAPY | Age: 48
Setting detail: THERAPIES SERIES
Discharge: HOME | End: 2023-12-15
Payer: COMMERCIAL

## 2023-12-15 PROCEDURE — 97140 MANUAL THERAPY 1/> REGIONS: CPT

## 2023-12-15 PROCEDURE — G0283 ELEC STIM OTHER THAN WOUND: HCPCS

## 2023-12-15 PROCEDURE — 97110 THERAPEUTIC EXERCISES: CPT

## 2023-12-15 NOTE — PROGRESS NOTES
Phone: [address]           Fax: [phone]                           Outpatient Physical Therapy                                                                            Daily Note    Patient: Jose R Guerin : 1975  CSN #: [id number]   Referring Physician: Grace Meza MD  Date: 12/15/2023     Treatment Diagnosis: L hip pain    Onset Date: 23  PT Insurance Information: [address] - 36 visits  Total # of Visits Approved: 24 Per Physician Order  Total # of Visits to Date: 18  No Show: 0  Canceled Appointment: 0    24 Plan of Care/Recert Due    Pre-Treatment Pain:  3/10  Subjective: Pt reports his current pain is a 3/10. Pt states his neck has still been pretty sore. Exercises:  Exercise 2: Scifit 10 min L4  Exercise 3: FSU 8\"/LSU 6\" x15 ea  Exercise 5: Standing BTB 4-way hip x15 ea  Exercise 6: Sidestepping,  monster walks, retro steps with BTB x3 laps  Exercise 9: HS stretch on step BLE 2x30\", SB calf stretch 2x30\", hip flexor stretch 2x30\"  Exercise 11: sit to stand 8# 2 x 15  Exercise 12: BOSU lunges 15x B LE    Manual:  Soft Tissue Mobilizaton: ball to L IT band. Modality:   Modality Flow Sheet:   Performed (X) Tx Modality   x Electrical Stim: IFC with CP 15 mins to L hip. Ultrasound: ___ W/cm2 x ___ mins  Duty factor: __100%  __50%  __20% __10%  Head size: 10 mm      ______ Other:   MHz: __1mHz __2 mHz  __3mHz  Location:                                          Hot Pack:   x Cold Pack:                     Assessment  Assessment: Continued strengthening program with fair tolerance noted. Pt continues to display point tenderness on L IT band with palpation. Activity Tolerance  Activity Tolerance: Patient tolerated treatment well    Patient Education  Patient Education: Continue stretching program.  Pt verbalized/demonstrated good understanding:     [x] Yes         [] No, pt required further clarification.      Post Treatment Pain: Pt's  is calling in re to MRI results.  Please call back

## 2023-12-26 ENCOUNTER — HOSPITAL ENCOUNTER (OUTPATIENT)
Dept: PHYSICAL THERAPY | Age: 48
Setting detail: THERAPIES SERIES
Discharge: HOME | End: 2023-12-26
Payer: COMMERCIAL

## 2023-12-26 PROCEDURE — 97140 MANUAL THERAPY 1/> REGIONS: CPT

## 2023-12-26 PROCEDURE — 97110 THERAPEUTIC EXERCISES: CPT

## 2023-12-26 PROCEDURE — G0283 ELEC STIM OTHER THAN WOUND: HCPCS

## 2023-12-26 NOTE — PROGRESS NOTES
Phone: [address] [facility]           Fax: [phone]                           Outpatient Physical Therapy                                                                            Daily Note    Patient: Antony Lozoya : 1975  CSN #: [id number]   Referring Physician: Tracie Bazzi MD  Date: 2023     Treatment Diagnosis: L hip pain    Onset Date: 23  PT Insurance Information: [address] - 39 visits  Total # of Visits Approved: 24 Per Physician Order  Total # of Visits to Date: 20  No Show: 0  Canceled Appointment: 0    24 Plan of Care/Recert Due    Pre-Treatment Pain:  3/10  Subjective: Pt reports his hip is still sore. Pt rates current pain a 3/10. Exercises:  Exercise 2: Scifit 10 min L4  Exercise 3: FSU 8\"/LSU 6\" x15 ea  Exercise 5: Standing BTB 4-way hip x15 ea  Exercise 6: Sidestepping,  monster walks, retro steps with BTB x3 laps  Exercise 9: HS stretch on step BLE 2x30\", SB calf stretch 2x30\", hip flexor stretch 2x30\"  Exercise 11: sit to stand 8# 2 x 15  Exercise 12: BOSU lunges 15x B LE    Manual:  Soft Tissue Mobilizaton: Cupping to L IT band. followed by thermoprobe    Modality:   Modality Flow Sheet:   Performed (X) Tx Modality   x Electrical Stim: IFC with HP 15 mins for discomfort. Ultrasound: ___ W/cm2 x ___ mins  Duty factor: __100%  __50%  __20% __10%  Head size: 10 mm      ______ Other:   MHz: __1mHz __2 mHz  __3mHz  Location:                                         x Hot Pack:    Cold Pack:     Assessment  Assessment: Began MFD cupping today to L IT band with fair tolerance noted. Continued strengthening program with no increased pain noted. IFC with HP 15 mins at end of session for discomfort. Activity Tolerance  Activity Tolerance: Patient tolerated treatment well    Patient Education  Patient Education: Cupping rationale.   Pt verbalized/demonstrated good understanding:     [x] Yes         [] No, pt required further

## 2023-12-28 ENCOUNTER — HOSPITAL ENCOUNTER (OUTPATIENT)
Dept: PHYSICAL THERAPY | Age: 48
Setting detail: THERAPIES SERIES
Discharge: HOME | End: 2023-12-28
Payer: COMMERCIAL

## 2023-12-28 PROCEDURE — 97110 THERAPEUTIC EXERCISES: CPT

## 2023-12-28 PROCEDURE — 97140 MANUAL THERAPY 1/> REGIONS: CPT

## 2023-12-28 PROCEDURE — G0283 ELEC STIM OTHER THAN WOUND: HCPCS

## 2023-12-28 NOTE — PROGRESS NOTES
Phone: [address] [facility]           Fax: [phone]                           Outpatient Physical Therapy                                                                            Daily Note    Patient: Jose R Guerin : 1975  CSN #: [id number]   Referring Physician: Grace Meza MD  Date: 2023     Treatment Diagnosis: L hip pain    Onset Date: 23  PT Insurance Information: [address] - 36 visits  Total # of Visits Approved: 24 Per Physician Order  Total # of Visits to Date: 21  No Show: 0  Canceled Appointment: 0    24 Plan of Care/Recert Due    Pre-Treatment Pain:  3/10  Subjective: Pt reports he had relief with the cups. Pt rates current pain a 3/10. Exercises:  Exercise 1: HEP LTR, clam shells  Exercise 2: Scifit 10 min L4  Exercise 3: FSU 9\"/LSU 9\" x15 ea  Exercise 5: Standing BTB 4-way hip x15 ea  Exercise 6: Sidestepping,  monster walks, retro steps with BTB x3 laps  Exercise 9: HS stretch on step BLE 2x30\", SB calf stretch 2x30\", hip flexor stretch 2x30\"  Exercise 11: sit to stand 10# 2 x 15  Exercise 12: BOSU lunges 15x B LE    Manual:  Other: IASTM to L IT band    Modality:   Modality Flow Sheet:   Performed (X) Tx Modality   x Electrical Stim: IFC with HP 15 mins for discomfort. Ultrasound: ___ W/cm2 x ___ mins  Duty factor: __100%  __50%  __20% __10%  Head size: 10 mm      ______ Other:   MHz: __1mHz __2 mHz  __3mHz  Location:                                         x Hot Pack:    Cold Pack:     Assessment  Assessment: L glute MMT 4+/5. Pain remains on R IT band near greater trochanter. Will continue. Activity Tolerance  Activity Tolerance: Patient tolerated treatment well    Patient Education  Patient Education: Contiue stretching. Pt verbalized/demonstrated good understanding:     [x] Yes         [] No, pt required further clarification.      Post Treatment Pain:  2/10    Plan  Plan Frequency: 2x/wk  Plan weeks: 4 weeks     Goals

## 2024-01-02 ENCOUNTER — HOSPITAL ENCOUNTER (OUTPATIENT)
Dept: PHYSICAL THERAPY | Age: 49
Setting detail: THERAPIES SERIES
Discharge: HOME | End: 2024-01-02
Payer: COMMERCIAL

## 2024-01-02 PROCEDURE — 97140 MANUAL THERAPY 1/> REGIONS: CPT

## 2024-01-02 PROCEDURE — G0283 ELEC STIM OTHER THAN WOUND: HCPCS

## 2024-01-02 PROCEDURE — 97110 THERAPEUTIC EXERCISES: CPT

## 2024-01-02 NOTE — PLAN OF CARE
[facility]           Phone: [phone]             Outpatient Physical Therapy  Fax: [phone]                                           Date: 2024  Patient: Michael Hammond : 1975 CSN #: [id number]   Referring Physician: Kalpesh Howe MD      [] Plan of Care   [x] Updated Plan of Care    Dates of Service to Include: 2024 to 24    Diagnosis:  M25.551 Pain in L hip, m25.551 pain in R hip    Rehab (Treatment) Diagnosis:  L hip pain             Onset Date:  23    Attendance  Total # of Visits to Date: 22        Assessment  Assessment: Pt completed all exercises this date. Pt reports an increase in R hip soreness with sidewalking and monster walks. PT continued to perform cupping to L hip and IT band followed by theramprobe.      Goals  Short Term Goals  Time Frame for Short Term Goals: 2 weeks  Short Term Goal 1: Pt will be educated on his POC and HEP-met  Short Term Goal 2: Pt will maintain proper pelvic alignment in order to decrease L hip pain-met  Long Term Goals  Time Frame for Long Term Goals : 4 weeks  Long Term Goal 1: Pt will be safe and independent with his HEP-progressing  Long Term Goal 2: Pt will increase L hip abduction strength to 5/5 in order to increase ambulation tolerance-progressing(4+/5 23)  Long Term Goal 3: Pt will deny tenderness to L hip trochanter in order to increase sleeping tolerance on L side-progressing  Long Term Goal 4: Pt will report 70% improvement in symptoms-progressing ((40%) 23)     Prognosis  Therapy Prognosis: Good    Treatment Plan   Plan Frequency: 2x/wk  Plan weeks: 4 weeks  [x] HP/CP      [x] Electrical Stim   [x] Therapeutic Exercise      [] Gait Training  [] Aquatics   [x] Ultrasound         [x] Patient Education/HEP   [x] Manual Therapy  [] Traction    [] Neuro-eptty        [x] Soft Tissue Mobs            [] Therapeutic Activity  []

## 2024-01-04 ENCOUNTER — HOSPITAL ENCOUNTER (OUTPATIENT)
Dept: PHYSICAL THERAPY | Age: 49
Setting detail: THERAPIES SERIES
Discharge: HOME | End: 2024-01-04
Payer: COMMERCIAL

## 2024-01-04 PROCEDURE — 97110 THERAPEUTIC EXERCISES: CPT

## 2024-01-04 PROCEDURE — G0283 ELEC STIM OTHER THAN WOUND: HCPCS

## 2024-01-04 NOTE — PROGRESS NOTES
Phone: [phone]                 [facility]           Fax: [phone]                           Outpatient Physical Therapy                                                                            Daily Note    Patient: Michael Hammond : 1975  [facility] #: [id number]   Referring Physician: Kalpesh Howe MD  Date: 2024     Treatment Diagnosis: L hip pain    Onset Date: 23  PT Insurance Information: [facility] - 36 visits  Total # of Visits Approved: 24 Per Physician Order  Total # of Visits to Date: 23  No Show: 0  Canceled Appointment: 0    24 Plan of Care/Recert Due    Pre-Treatment Pain:  2/10  Subjective: Pt states he is feeling pretty good.  Pt states his pain seems to be doing a little better.    Exercises:  Exercise 2: Scifit 10 min L4  Exercise 3: FSU 9\"/LSU 9\" x15 ea  Exercise 6: Sidestepping,  monster walks, retro steps with BTB x3 laps  Exercise 11: sit to stand 10# 2 x 15  Exercise 12: -testing for PT update    Modality:   Modality Flow Sheet:   Performed (X) Tx Modality   x Electrical Stim: IFC with HP 15 mins to L hip.      Ultrasound: ___ W/cm2 x ___ mins  Duty factor: __100%  __50%  __20% __10%  Head size: 10 mm      ______ Other:   MHz: __1mHz __2 mHz  __3mHz  Location:                                         x Hot Pack:    Cold Pack:     Assessment  Assessment: Pt reports a pain rating of 0-8/10 on normal day to day basis but states he has been doing much better.  Pt reports approx 40% overall improvement woth PT.  Current L hip ROM 0-120* with a 4+/5 hip flexion and abd MMT.  Plan is to hold for 2 weeks then d/c to HEP per Pt request at this time d/t pt reporting he is feeling much better and no longer needs formal PT.    Activity Tolerance  Activity Tolerance: Patient tolerated treatment well    Patient Education  Patient Education: HEP  Pt verbalized/demonstrated good understanding:     [x] Yes         [] No, pt required further clarification.     Post Treatment

## 2024-10-01 NOTE — PROGRESS NOTES
[facility] Updated     Phone: [phone]                 [facility]           Fax: [phone]                           Outpatient Physical Therapy                                                                            Daily Note    Patient: Michael Hammond : 1975  [facility] #: [id number]   Referring Physician: Kalpesh Howe MD  Date: 2024    Diagnosis: M25.551 Pain in L hip, m25.551 pain in R hip  Treatment Diagnosis: L hip pain    Onset Date: 23  PT Insurance Information: [facility] - 36 visits  Total # of Visits Approved: 24 Per Physician Order  Total # of Visits to Date: 24 Plan of Care/Recert Due    Pre-Treatment Pain:  2/10  Subjective: Pt reports he is feeling pretty good, pt reports 2/10 pain    Exercises:  Exercise 1: HEP LTR, clam shells  Exercise 2: Scifit 10 min L4  Exercise 3: FSU 9\"/LSU 9\" x15 ea  Exercise 5: Standing BTB 4-way hip x15 ea  Exercise 6: Sidestepping,  monster walks, retro steps with BTB x3 laps    Manual:  Soft Tissue Mobilizaton: Cupping to L IT band. followed by thermaprobe    Modality:     Modality Flow Sheet:   Performed (X) Tx Modality   x Electrical Stim: IFC x15 min to L hip with HP to reduce post exercise soreness       Ultrasound: ___ W/cm2 x ___ mins  Duty factor: __100%  __50%  __20% __10%  Head size: 10 mm      ______ Other:   MHz: __1mHz __2 mHz  __3mHz  Location:                                         x Hot Pack: 15 min to L hip     Cold Pack:    Cervical Traction:  Pull:  Weight ____  Rest ____    Hold Time: _____ sec   Rest: ____  Total Tx Time: ____min    Lumbar Traction:  Pull:  Weight ____  Rest ____    Hold Time: _____ sec   Rest: ____  Total Tx Time: ____min  Bed Split:  Yes _____   or   No  _____    Iontophoresis:  Dexamethasone ______  Other: ______  Doseage: _____mA        Dry Needling: ___\" needle to at homeostatic neuro- trigger points to...   ___No manipulation/static  ___Basic Manipulation  ___Pistoning Manipulation  ___Needle